# Patient Record
Sex: FEMALE | Race: WHITE | Employment: UNEMPLOYED | URBAN - METROPOLITAN AREA
[De-identification: names, ages, dates, MRNs, and addresses within clinical notes are randomized per-mention and may not be internally consistent; named-entity substitution may affect disease eponyms.]

---

## 2019-08-26 NOTE — H&P
Date: 2019 9:00 AM   Patient Name: Elsa Marin   Account #: [id number]    Gender: Female    (age): 1985 (28)       Provider:     JONATHON Neves. Nunu Gee MD        Referring Physician:     Candace Aldana  [address] [facility], [facility] [address] [facility]  [phone] (phone)  [phone] (fax)        Chief Complaint: pain with tube feeding           History of Present Illness:   Here with mom and sister for issues with Ubaldo-Key button. Had another feeding tube for 30years that was more centrally located, but a few years ago this was moved more laterally so they could perform a NISEEN due to severe reflux. Mom changes the Ubaldo-Key button every 3 months at home and there are no issues. This tube is used for hydration only, she eats by mouth. Mom states ever since she changed the tube 3 weeks ago, Manuel Dawkins indicates she is in a lot of pain  (crying and screaming) with flushing 60-70% of the time. There are times where there is no pain but it happens more often than it doesn't. She does not use the tube for feeding. Saw Dr. Car Garcia yesterday who deflated the tube and removed it and it looked OK. Mom notes more redness aroaund the tube than usual and there is occasional drainage, but nothing purulent and no odor. Mom does note that within a few hours after eating, Manuel Dawkins seems uncomfortable but there is no reflux. 4.0 Elena tube-mom thinks the 4.0 sicks out more than the 3.0 and a 5.0 was too long. She is not on any reflux meds. Mom thinks last EGD may have been 4 years ago? HX of chronic constipation but managing well with 2d miralax then off one day. Has a normal, solid stool. NO bleeding. Unable to obtain blood pressure due to contracted and spastic movement. Yesterday /60 at Dr. Irma Bucio office. +weight gain per mom since having NISSEN. ? Here with mom and sister for issues with Ubaldo-Key button.  Had another feeding tube for 30years that was more centrally located, but a few years ago this was moved more laterally so they could perform a NISEEN due to severe reflux. Mom changes the Ubaldo-Key button every 3 months at home and there are no issues. This tube is used for hydration only, she eats by mouth. Mom states ever since she changed the tube 3 weeks ago, [facility] indicates she is in a lot of pain  (crying and screaming) with flushing 60-70% of the time. There are times where there is no pain but it happens more often than it doesn't. She does not use the tube for feeding. Saw Dr. Arnol Winn yesterday who deflated the tube and removed it and it looked OK. Mom notes more redness aroaund the tube than usual and there is occasional drainage, but nothing purulent and no odor. Mom does note that within a few hours after eating, [facility] seems uncomfortable but there is no reflux. 4.0 Elena tube-mom thinks the 4.0 sicks out more than the 3.0 and a 5.0 was too long. She is not on any reflux meds. Mom thinks last EGD may have been 4 years ago? HX of chronic constipation but managing well with 2d miralax then off one day. Has a normal, solid stool. NO bleeding. Unable to obtain blood pressure due to contracted and spastic movement. Yesterday /60 at Dr. Haile Valles office. +weight gain per mom since having NISSEN. Past Medical History      Medical Conditions: Daily activites, walking, moving from bed to chair? Oxygen  Tracheotomy   Surgical Procedures:  Other major surgeries:, Tracheotomy feeding tube  Wrap of stomach, Dr. Steve Jacobsen 08/2015   Dx Studies: CAT Scan, 09/4/13  Patient Service Interview, 8/16/2019   Medications: albuterol (bulk) nebulizer as needed  lorazepam 1 mg TK 1 T PO BID PRN  Miralax 17 gram 1 caplet by mouth once a day  Stool Softener 240 mg Take 1 by mouth once a day   Allergies: Patient has no known allergies or drug allergies   Immunizations: No Immunizations      Social History      Alcohol: None   Tobacco: Never smoker   Drugs: None   Exercise: None   Caffeine: None   Marital Status:          Occupation:               Family History No history of Colon Cancer, Colon Polyps, Esophogeal Cancer, GI Cancers, IBD (Crohn's or UC), Liver disease        Review of Systems:   Cardiovascular: Presents suffers from Sage Serrano. Denies chest pain, irregular heart beat, palpitations, peripheral edema, syncope. Constitutional: Denies fatigue, fever, loss of appetite, weight gain, weight loss. ENMT: Denies nose bleeds, sore throat, hearing loss. Endocrine: Denies excessive thirst, heat intolerance. Eyes: Denies loss of vision. Gastrointestinal: Presents suffers from abdominal pain, constipation, stomach cramps. Denies abdominal swelling, change in bowel habits, diarrhea, Bloating/gas, heartburn, jaundice, nausea, rectal bleeding, vomiting, dysphagia, rectal pain, Stool incontinence, hematemesis. Genitourinary: Denies dark urine, dysuria, frequent urination, hematuria, incontinence. Hematologic/Lymphatic: Denies easy bruising, prolonged bleeding. Integumentary: Denies itching, rashes, sun sensitivity. Musculoskeletal: Denies arthritis, back pain, gout, joint pain, muscle weakness, stiffness. Neurological: Denies dizziness, fainting, frequent headaches, memory loss. Psychiatric: Denies anxiety, depression, difficulty sleeping, hallucinations, nervousness, panic attacks, paranoia. Respiratory: Denies cough, dyspnea, wheezing. Vital Signs:   Weight (lbs/oz) Height (ft/in) BMI   100 /  5 /  19.53   Notes BP taken in leg yesterday by Dr. Chadwick Hun: 144/68      Physical Exam:   Constitutional:      Appearance: NAD, in w/c. Communication: nonverbal.   Skin:      Inspection: No jaundice or rash. Palpation: warm and dry. Head/face: Inspection: NCAT. Eyes:      Conjunctivae/lids: normal.   Pupils/irises: PERRLA. ENMT:      External: unable to assess. Neck:      Neck: trachea midline. Respiratory:      Effort: O2 in place, normal respiratory function. Gastrointestinal/Abdomen:      Abdomen: Elena button intact with slight erythema around tube site, no purulence; abdomen is soft and nontender. Liver/Spleen: no ascites, liver and spleen not palpable. Musculoskeletal:      Gait/station: nonambulatory; contracted extremities . Muscles: rigidity, contracture. Psychiatric:      Judgment/insight: unable to assess. Mood and affect: unable to assess. Lab Results: No Electronic Results      Impressions: Constipation, chronic-improved on miralax  GERD-improved s/p NISSEN  Gastrostomy status? ?Constipation, chronic-improved on miralax? ?  ? ?GERD-improved s/p NISSEN? ?  ? ? Gastrostomy status? Assessment: Concern for compression ulcer associated with tube in light of weight gain, etc.  Will perform endoscopic evaluation for further evaluate. ? Concern for compression ulcer associated with tube in light of weight gain, etc.  Will perform endoscopic evaluation for further evaluate. Plan: Upper Endoscopy  Upper Endoscopy (EGD): options, risks, benefits and complications of bleeding, tear, surgical repair (1:1000) and reaction to medication, aspiration, and pneumonia explained to patient. 5% chance of missing lesions explained. All questions answered. ? ? Upper Endoscopy? ?  ? ? Upper Endoscopy (EGD): options, risks, benefits and complications of bleeding, tear, surgical repair (1:1000) and reaction to medication, aspiration, and pneumonia explained to patient. 5% chance of missing lesions explained. All questions answered. ? Risk & Medical Necessity: The patient requires Moderate Severity care for this visit. Diagnosis and management options are Extensive. The amount of data reviewed and/or ordered is Minimal/None. The level of risk is Moderate. Notes: Dr. Geni Paniagua  was available for consultation during this visit.              Tree Espinoza PA-C     Electronically signed on 8/16/2019 9:45:31 AM by Tree Espinoza PA-C Peter Hernandez. Alecia Wick MD     Electronically signed on 2019 8:28:23 PM by Peter Hernandez. Alecia Wick MD                                 Addenda   #1 - 2019 8:28:10 PM - [facility]  Physician Addendum: I have seen and examined the patient with Avila Martinez NP. Pertinent history is pain with water thru peg. Exam of the heart and lungs are unremarkable. Abdominal exam showed has peg button. We will move forward with the impression and plan of egd. Electronically signed on 2019 8:28:10 PM by Peter Hernandez.  Alecia Wick MD          Naina Lee, MRN [id number],  1985 First Visit, 2019                                                                                                                                                        New     Modify          Delete     Delete all     Edit Wording          Sign     [email or web address]

## 2019-08-29 RX ORDER — DOCUSATE SODIUM 50 MG/5ML
50 LIQUID ORAL EVERY OTHER DAY
COMMUNITY

## 2019-08-29 RX ORDER — LORAZEPAM 1 MG/1
1 TABLET ORAL 2 TIMES DAILY
COMMUNITY

## 2019-08-30 ENCOUNTER — HOSPITAL ENCOUNTER (OUTPATIENT)
Age: 34
Setting detail: OUTPATIENT SURGERY
Discharge: HOME | End: 2019-08-30
Attending: SPECIALIST | Admitting: SPECIALIST
Payer: MEDICAID

## 2019-08-30 ENCOUNTER — ANESTHESIA (OUTPATIENT)
Dept: ENDOSCOPY | Age: 34
End: 2019-08-30
Payer: MEDICAID

## 2019-08-30 ENCOUNTER — ANESTHESIA EVENT (OUTPATIENT)
Dept: ENDOSCOPY | Age: 34
End: 2019-08-30
Payer: MEDICAID

## 2019-08-30 VITALS
SYSTOLIC BLOOD PRESSURE: 110 MMHG | RESPIRATION RATE: 22 BRPM | HEIGHT: 55 IN | HEART RATE: 116 BPM | BODY MASS INDEX: 23.14 KG/M2 | DIASTOLIC BLOOD PRESSURE: 51 MMHG | WEIGHT: 100 LBS | OXYGEN SATURATION: 99 % | TEMPERATURE: 99.2 F

## 2019-08-30 PROCEDURE — 76060000031 HC ANESTHESIA FIRST 0.5 HR: Performed by: SPECIALIST

## 2019-08-30 PROCEDURE — 74011250636 HC RX REV CODE- 250/636: Performed by: NURSE ANESTHETIST, CERTIFIED REGISTERED

## 2019-08-30 PROCEDURE — 88305 TISSUE EXAM BY PATHOLOGIST: CPT

## 2019-08-30 PROCEDURE — 76040000019: Performed by: SPECIALIST

## 2019-08-30 PROCEDURE — 77030021593 HC FCPS BIOP ENDOSC BSC -A: Performed by: SPECIALIST

## 2019-08-30 RX ORDER — FLUMAZENIL 0.1 MG/ML
0.2 INJECTION INTRAVENOUS
Status: DISCONTINUED | OUTPATIENT
Start: 2019-08-30 | End: 2019-08-30 | Stop reason: HOSPADM

## 2019-08-30 RX ORDER — PROPOFOL 10 MG/ML
INJECTION, EMULSION INTRAVENOUS
Status: DISCONTINUED | OUTPATIENT
Start: 2019-08-30 | End: 2019-08-30 | Stop reason: HOSPADM

## 2019-08-30 RX ORDER — PROPOFOL 10 MG/ML
INJECTION, EMULSION INTRAVENOUS AS NEEDED
Status: DISCONTINUED | OUTPATIENT
Start: 2019-08-30 | End: 2019-08-30 | Stop reason: HOSPADM

## 2019-08-30 RX ORDER — EPINEPHRINE 0.1 MG/ML
1 INJECTION INTRACARDIAC; INTRAVENOUS
Status: DISCONTINUED | OUTPATIENT
Start: 2019-08-30 | End: 2019-08-30 | Stop reason: HOSPADM

## 2019-08-30 RX ORDER — SODIUM CHLORIDE 9 MG/ML
50 INJECTION, SOLUTION INTRAVENOUS CONTINUOUS
Status: DISCONTINUED | OUTPATIENT
Start: 2019-08-30 | End: 2019-08-30 | Stop reason: HOSPADM

## 2019-08-30 RX ORDER — ESOMEPRAZOLE MAGNESIUM 40 MG/1
40 FOR SUSPENSION ORAL
Qty: 60 PACKET | Refills: 1 | Status: SHIPPED | OUTPATIENT
Start: 2019-08-30

## 2019-08-30 RX ORDER — FENTANYL CITRATE 50 UG/ML
25 INJECTION, SOLUTION INTRAMUSCULAR; INTRAVENOUS AS NEEDED
Status: DISCONTINUED | OUTPATIENT
Start: 2019-08-30 | End: 2019-08-30 | Stop reason: HOSPADM

## 2019-08-30 RX ORDER — DEXTROMETHORPHAN/PSEUDOEPHED 2.5-7.5/.8
1.2 DROPS ORAL
Status: DISCONTINUED | OUTPATIENT
Start: 2019-08-30 | End: 2019-08-30 | Stop reason: HOSPADM

## 2019-08-30 RX ORDER — SODIUM CHLORIDE 9 MG/ML
INJECTION, SOLUTION INTRAVENOUS
Status: DISCONTINUED | OUTPATIENT
Start: 2019-08-30 | End: 2019-08-30 | Stop reason: HOSPADM

## 2019-08-30 RX ORDER — ATROPINE SULFATE 0.1 MG/ML
0.5 INJECTION INTRAVENOUS
Status: DISCONTINUED | OUTPATIENT
Start: 2019-08-30 | End: 2019-08-30 | Stop reason: HOSPADM

## 2019-08-30 RX ORDER — NALOXONE HYDROCHLORIDE 0.4 MG/ML
0.4 INJECTION, SOLUTION INTRAMUSCULAR; INTRAVENOUS; SUBCUTANEOUS
Status: DISCONTINUED | OUTPATIENT
Start: 2019-08-30 | End: 2019-08-30 | Stop reason: HOSPADM

## 2019-08-30 RX ORDER — MIDAZOLAM HYDROCHLORIDE 1 MG/ML
.25-5 INJECTION, SOLUTION INTRAMUSCULAR; INTRAVENOUS AS NEEDED
Status: DISCONTINUED | OUTPATIENT
Start: 2019-08-30 | End: 2019-08-30 | Stop reason: HOSPADM

## 2019-08-30 RX ADMIN — PROPOFOL 50 MG: 10 INJECTION, EMULSION INTRAVENOUS at 12:48

## 2019-08-30 RX ADMIN — PROPOFOL 100 MCG/KG/MIN: 10 INJECTION, EMULSION INTRAVENOUS at 12:45

## 2019-08-30 RX ADMIN — SODIUM CHLORIDE: 9 INJECTION, SOLUTION INTRAVENOUS at 12:39

## 2019-08-30 NOTE — DISCHARGE INSTRUCTIONS
[address] [facility] YELITZA Melgoza MD  [phone]      August 30, 2019     Samra Schwartz  YOB: 1985    ENDOSCOPY DISCHARGE INSTRUCTIONS    If there is redness at IV site you should apply warm compress to area. If redness or soreness persist contact Dr. Sriram Melgoza' or your primary care doctor. Gaseous discomfort may develop, but walking, belching will help relieve this. You may not operate a vehicle for 12 hours  You may not operate machinery or dangerous appliances for rest of today  You may not drink alcoholic beverages for 12 hours  Avoid making any critical decisions for 24 hours    DIET:  You may resume your normal diet, but some patients find that heavy or large meals may lead to indigestion or vomiting. I suggest a light meal as first food intake. MEDICATIONS:  The use of some over-the-counter pain medication may lead to bleeding after biopsies or other procedures you may have had done. Tylenol (also called acetaminophen) is safe to take and will not lead to bleeding. Based on the procedure you had today you may not safely take aspirin or aspirin-like products for the next ten (10) days. ACTIVITY:  You may resume your normal household activities, but it is recommended that you spend the remainder of the day resting -  avoid any strenuous activity. CALL DR. Lexie Eaton' OFFICE IF:  Increasing pain, nausea, vomiting  Abdominal distension (swelling)  Significant new or increased bleeding (oral or rectal)  Fever/Chills  Chest pain/shortness of breath                   Additional instructions:   No aspirin 10 days  The feeding tube looks fine, but there is an area of irritation in the stomach which I biopsied and for that I suggest she try nexium for a few weeks. It was an honor to be your doctor today. Please let me or my office staff know if you have any feedback about today's procedure. Nish Saul MD

## 2019-08-30 NOTE — ANESTHESIA POSTPROCEDURE EVALUATION
Procedure(s):  ESOPHAGOGASTRODUODENOSCOPY (EGD)  ESOPHAGOGASTRODUODENAL (EGD) BIOPSY. MAC, general - backup    Anesthesia Post Evaluation      Multimodal analgesia: multimodal analgesia not used between 6 hours prior to anesthesia start to PACU discharge  Patient location during evaluation: bedside  Patient participation: complete - patient cannot participate  Level of consciousness: awake (BASELINE MENTAL STATUS)  Pain management: adequate  Airway patency: patent  Anesthetic complications: no  Cardiovascular status: acceptable  Respiratory status: acceptable  Hydration status: acceptable  Post anesthesia nausea and vomiting:  controlled      Vitals Value Taken Time   /51 8/30/2019  1:21 PM   Temp     Pulse 112 8/30/2019  1:28 PM   Resp 24 8/30/2019  1:28 PM   SpO2 91 % 8/30/2019  1:22 PM   Vitals shown include unvalidated device data.

## 2019-08-30 NOTE — PERIOP NOTES
1030- patient upon arrival was extremely spastic, asked  from anesthesia for recommendations for placement of IV/sedation. After a long discussion between Dr. Avinash Little and the patients mother, Dr. Avinash Little instructed her to administer Ativan 1 mg via her feeding tube, that was brought from home. 1035- Patients mother, the primary care giver gave the patient 1 mg of crushed Ativan, that was brought form home, through her feeding tube after discussing it with Dr. Shirley Grief was discussed with Dr. Avinash Little and staff, we will take patient back to procedure room and give her anesthesia to insert IV and get pre procedure vital signs off of monitor. After this, we will begin the endoscopy EGD procedure under anesthesia. 1118- Patient resting comfortably on stretcher, mother/caregiver (Shaniqua Padilla) at bedside, waiting for procedure start, will continue to monitor pt.    1235- Patient was transported to procedure room, and care was assumed by anesthesia team including IV insertion. Patient will be monitored and sedated by anesthesia for their procedure. See anesthesia note. 1253- Endoscope was pre-cleaned at bedside immediately following procedure by lacy Florentino. 1258- Patient tolerated procedure without problems. Abdomen soft and patient arousable. Patient does not look to be in any pain. Report received from CRNA, see anesthesia note. Patient transported to endoscopy recovery area via stretcher report given to, Amor Maxwell RN.

## 2019-08-30 NOTE — PROGRESS NOTES
Prescript Nexium 40 mg packet phoned into [facility]. Spoke with Pharmacist Mortimer Rad.

## 2019-08-30 NOTE — ANESTHESIA PREPROCEDURE EVALUATION
Relevant Problems   No relevant active problems       Anesthetic History   No history of anesthetic complications            Review of Systems / Medical History  Patient summary reviewed, nursing notes reviewed and pertinent labs reviewed    Pulmonary                Comments: BPD    PULMONARY FIBROSIS    02 AT HOME   Neuro/Psych             Comments: STATIC ENCEPHALOPATHY (CP) Cardiovascular                  Exercise tolerance: >4 METS     GI/Hepatic/Renal               Comments: FEEDING TUBE Endo/Other  Within defined limits           Other Findings              Physical Exam    Airway            Comments: UNABLE TO ADEQUATELY ASSESS DUE TO LACK OF COOPERATION Cardiovascular    Rhythm: regular  Rate: normal         Dental         Pulmonary  Breath sounds clear to auscultation               Abdominal  GI exam deferred       Other Findings            Anesthetic Plan    ASA: 3  Anesthesia type: MAC and general - backup          Induction: Inhalational  Anesthetic plan and risks discussed with: Mother      DISCUSSED WITH MOTHER OPTIONS FOR IV START OF IM INJECTION, INHAL. INDUCTION, SEDATION VIA G TUBE.  MOM WOULD PREFER INHAL INDUCTION BUT ILL ALSO GIVE DAILY DOSE OF LORAZEPAM VIA G TUBE PRIOR.

## 2019-08-30 NOTE — PROCEDURES
[address] [place], [place]  [phone]      2019    Esophagogastroduodenoscopy (EGD) Procedure Note  Gifty Sheth  : 1985  [address] [place] [address] Medical Record Number: [id number]      Indications:    Tube feeding difficulties. Referring Physician:  Garrel Hodgkin, MD  Anesthesia/Sedation:  Conscious sedation/deep sedation/monitored anesthesia -- see notes. Endoscopist:  Dr. Naty Riley  Complications:  None  Estimated Blood Loss:  None    Permit:  The indications, risks, benefits and alternatives were reviewed with the patient or their decision maker who was provided an opportunity to ask questions and all questions were answered. The specific risks of esophagogastroduodenoscopy with conscious sedation were reviewed, including but not limited to anesthetic complication, bleeding, adverse drug reaction, missed lesion, infection, IV site reactions, and intestinal perforation which would lead to the need for surgical repair. Alternatives to EGD including radiographic imaging, observation without testing, or laboratory testing were reviewed as well as the limitations of those alternatives discussed. After considering the options and having all their questions answered, the patient or their decision maker provided both verbal and written consent to proceed. Procedure in Detail:  After obtaining informed consent, positioning of the patient in the left lateral decubitus position, and conduction of a pre-procedure pause or \"time out\" the endoscope was introduced into the mouth and advanced to the duodenum. A careful inspection was made, and findings or interventions are described below. Findings:   Esophagus:normal  Stomach: Internal balloon is in proper position.   NO ulceration at PEG site internally but there is patchy erythema with some overlying hematin in the antrum biopsied for helicobacter. Duodenum/jejunum: normal      Specimens: See above    Impression: Gastritis, but low profile feeding device appears to be fine. Recommendations:  -Acid suppression with a proton pump inhibitor. , -Await pathology. Thank you for entrusting me with this patient's care. Please do not hesitate to contact me with any questions or if I can be of assistance with any of your other patients' GI needs. Signed By: Rafael Gaona MD                        August 30, 2019     Surgical assistant none. Implants none unless specified.

## 2019-08-30 NOTE — ROUTINE PROCESS
Herminia Ewinglise  1985  [id number]    Situation:  Verbal report received from: Jasmina Aguirre  Procedure: Procedure(s):  ESOPHAGOGASTRODUODENOSCOPY (EGD)  ESOPHAGOGASTRODUODENAL (EGD) BIOPSY    Background:    Preoperative diagnosis: EPIGASTRIC PAIN  Postoperative diagnosis: gastritis     :  Dr. Hayder Carrillo  Assistant(s): Endoscopy Technician-1: Rafy Fatima  Endoscopy RN-1: Kimberly Saenz    Specimens:   ID Type Source Tests Collected by Time Destination   1 : Antrum Biopsy Preservative   Lane Brown MD 8/30/2019 1252 Pathology     H. Pylori  no    Assessment:  Intra-procedure medications     Anesthesia gave intra-procedure sedation and medications, see anesthesia flow sheet yes    Intravenous fluids: NS@ KVO     Vital signs stable     Abdominal assessment: round and soft     Recommendation:  Discharge patient per MD order.   Family or Friend   Permission to share finding with family or friend yes

## 2019-08-30 NOTE — INTERVAL H&P NOTE
Pre-Endoscopy H&P Update  Chief complaint/HPI/ROS:  The indication for the procedure, the patient's history and the patient's current medications are reviewed prior to the procedure and that data is reported on the H&P to which this document is attached. Any significant complaints with regard to organ systems will be noted, and if not mentioned then a review of systems is not contributory. 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 No longer has tracheostomy but stoma still present and patent and used for clearing airway. Social   Social History     Tobacco Use    Smoking status: Never Smoker    Smokeless tobacco: Never Used   Substance Use Topics    Alcohol use: No      History reviewed. No pertinent family history. No Known Allergies   Prior to Admission Medications   Prescriptions Last Dose Informant Patient Reported? Taking? LORazepam (ATIVAN) 1 mg tablet 8/30/2019 at 1035  Yes Yes   Sig: Take 1 mg by mouth two (2) times a day. Oxygen 8/30/2019 at Unknown time  Yes Yes   Sig: Indications: 2 liters continuous. albuterol sulfate (PROVENTIL;VENTOLIN) 2.5 mg/0.5 mL Nebu nebulizer solution 4/30/2019 at as needed  Yes No   Sig: by Nebulization route once. cetirizine HCl (ZYRTEC PO) 8/29/2019 at pm  Yes Yes   Sig: Take 10 mg by mouth. docusate (COLACE) 50 mg/5 mL liquid 8/28/2019 at pm  Yes Yes   Sig: Take 50 mg by mouth every other day.    polyethylene glycol (MIRALAX) 17 gram packet 8/28/2019 at pm  Yes No Sig: Take 17 g by mouth daily. Facility-Administered Medications: None       PHYSICAL EXAM:  The patient is examined immediately prior to the procedure. Visit Vitals  /57   Pulse (!) 123   Temp 99.2 °F (37.3 °C)   Resp 25   Ht 4' 5\" (1.346 m)   Wt 45.4 kg (100 lb)   SpO2 97%   Breastfeeding? No   BMI 25.03 kg/m²     Gen: Appears comfortable, no distress. Pulm: comfortable respirations with no abnormal audible breath sounds  HEART: well perfused, no abnormal audible heart sounds  GI: abdomen flat. PLAN:  Informed consent discussion held, patient afforded an opportunity to ask questions and all questions answered. After being advised of the risks, benefits, and alternatives, the patient requested that we proceed and indicated so on a written consent form. Will proceed with procedure as planned.   Meme Pacheco MD

## 2023-05-25 RX ORDER — POLYETHYLENE GLYCOL 3350 17 G/17G
17 POWDER, FOR SOLUTION ORAL DAILY
COMMUNITY

## 2023-05-25 RX ORDER — DOCUSATE SODIUM 50 MG/5ML
50 LIQUID ORAL EVERY OTHER DAY
COMMUNITY

## 2023-05-25 RX ORDER — ESOMEPRAZOLE MAGNESIUM 40 MG/1
40 FOR SUSPENSION ORAL
COMMUNITY
Start: 2019-08-30

## 2023-05-25 RX ORDER — LORAZEPAM 1 MG/1
1 TABLET ORAL 2 TIMES DAILY
COMMUNITY

## 2023-07-03 RX ORDER — MEPERIDINE HYDROCHLORIDE 25 MG/ML
12.5 INJECTION INTRAMUSCULAR; INTRAVENOUS; SUBCUTANEOUS EVERY 5 MIN PRN
Status: CANCELLED | OUTPATIENT
Start: 2023-07-03

## 2023-07-03 RX ORDER — ACETAMINOPHEN 325 MG/1
650 TABLET ORAL ONCE
Status: CANCELLED | OUTPATIENT
Start: 2023-07-03 | End: 2023-07-03

## 2023-07-03 RX ORDER — ONDANSETRON 2 MG/ML
4 INJECTION INTRAMUSCULAR; INTRAVENOUS
Status: CANCELLED | OUTPATIENT
Start: 2023-07-03 | End: 2023-07-04

## 2023-07-03 RX ORDER — DROPERIDOL 2.5 MG/ML
0.62 INJECTION, SOLUTION INTRAMUSCULAR; INTRAVENOUS
Status: CANCELLED | OUTPATIENT
Start: 2023-07-03 | End: 2023-07-04

## 2023-07-03 RX ORDER — SODIUM CHLORIDE, SODIUM LACTATE, POTASSIUM CHLORIDE, CALCIUM CHLORIDE 600; 310; 30; 20 MG/100ML; MG/100ML; MG/100ML; MG/100ML
INJECTION, SOLUTION INTRAVENOUS CONTINUOUS
Status: CANCELLED | OUTPATIENT
Start: 2023-07-03

## 2023-07-03 RX ORDER — DIPHENHYDRAMINE HYDROCHLORIDE 50 MG/ML
12.5 INJECTION INTRAMUSCULAR; INTRAVENOUS
Status: CANCELLED | OUTPATIENT
Start: 2023-07-03 | End: 2023-07-04

## 2023-07-03 RX ORDER — LABETALOL HYDROCHLORIDE 5 MG/ML
10 INJECTION, SOLUTION INTRAVENOUS
Status: CANCELLED | OUTPATIENT
Start: 2023-07-03

## 2023-07-03 RX ORDER — LIDOCAINE HYDROCHLORIDE 10 MG/ML
1 INJECTION, SOLUTION EPIDURAL; INFILTRATION; INTRACAUDAL; PERINEURAL
Status: CANCELLED | OUTPATIENT
Start: 2023-07-03 | End: 2023-07-04

## 2023-07-05 ENCOUNTER — HOSPITAL ENCOUNTER (OUTPATIENT)
Facility: HOSPITAL | Age: 38
Setting detail: SPECIMEN
Discharge: HOME | End: 2023-07-08
Payer: MEDICAID

## 2023-07-05 ENCOUNTER — ANESTHESIA EVENT (OUTPATIENT)
Facility: HOSPITAL | Age: 38
End: 2023-07-05
Payer: MEDICAID

## 2023-07-05 ENCOUNTER — ANESTHESIA (OUTPATIENT)
Facility: HOSPITAL | Age: 38
End: 2023-07-05
Payer: MEDICAID

## 2023-07-05 ENCOUNTER — HOSPITAL ENCOUNTER (OUTPATIENT)
Facility: HOSPITAL | Age: 38
Setting detail: OUTPATIENT SURGERY
Discharge: HOME | End: 2023-07-05
Attending: OBSTETRICS & GYNECOLOGY | Admitting: OBSTETRICS & GYNECOLOGY
Payer: MEDICAID

## 2023-07-05 VITALS
SYSTOLIC BLOOD PRESSURE: 120 MMHG | DIASTOLIC BLOOD PRESSURE: 59 MMHG | OXYGEN SATURATION: 100 % | HEART RATE: 85 BPM | RESPIRATION RATE: 16 BRPM

## 2023-07-05 PROBLEM — N94.6 DYSMENORRHEA: Status: ACTIVE | Noted: 2023-07-05

## 2023-07-05 PROCEDURE — 7100000001 HC PACU RECOVERY - ADDTL 15 MIN: Performed by: OBSTETRICS & GYNECOLOGY

## 2023-07-05 PROCEDURE — 3700000001 HC ADD 15 MINUTES (ANESTHESIA): Performed by: OBSTETRICS & GYNECOLOGY

## 2023-07-05 PROCEDURE — 3600000002 HC SURGERY LEVEL 2 BASE: Performed by: OBSTETRICS & GYNECOLOGY

## 2023-07-05 PROCEDURE — 2500000003 HC RX 250 WO HCPCS: Performed by: NURSE ANESTHETIST, CERTIFIED REGISTERED

## 2023-07-05 PROCEDURE — 3700000000 HC ANESTHESIA ATTENDED CARE: Performed by: OBSTETRICS & GYNECOLOGY

## 2023-07-05 PROCEDURE — 2709999900 HC NON-CHARGEABLE SUPPLY: Performed by: OBSTETRICS & GYNECOLOGY

## 2023-07-05 PROCEDURE — 2500000003 HC RX 250 WO HCPCS: Performed by: OBSTETRICS & GYNECOLOGY

## 2023-07-05 PROCEDURE — 88142 CYTOPATH C/V THIN LAYER: CPT

## 2023-07-05 PROCEDURE — 6360000002 HC RX W HCPCS: Performed by: NURSE ANESTHETIST, CERTIFIED REGISTERED

## 2023-07-05 PROCEDURE — 2580000003 HC RX 258: Performed by: NURSE ANESTHETIST, CERTIFIED REGISTERED

## 2023-07-05 PROCEDURE — 7100000010 HC PHASE II RECOVERY - FIRST 15 MIN: Performed by: OBSTETRICS & GYNECOLOGY

## 2023-07-05 PROCEDURE — 3600000012 HC SURGERY LEVEL 2 ADDTL 15MIN: Performed by: OBSTETRICS & GYNECOLOGY

## 2023-07-05 PROCEDURE — 7100000000 HC PACU RECOVERY - FIRST 15 MIN: Performed by: OBSTETRICS & GYNECOLOGY

## 2023-07-05 RX ORDER — PROPOFOL 10 MG/ML
INJECTION, EMULSION INTRAVENOUS PRN
Status: DISCONTINUED | OUTPATIENT
Start: 2023-07-05 | End: 2023-07-05 | Stop reason: SDUPTHER

## 2023-07-05 RX ORDER — DEXAMETHASONE SODIUM PHOSPHATE 4 MG/ML
INJECTION, SOLUTION INTRA-ARTICULAR; INTRALESIONAL; INTRAMUSCULAR; INTRAVENOUS; SOFT TISSUE PRN
Status: DISCONTINUED | OUTPATIENT
Start: 2023-07-05 | End: 2023-07-05 | Stop reason: SDUPTHER

## 2023-07-05 RX ORDER — PROPOFOL 10 MG/ML
INJECTION, EMULSION INTRAVENOUS CONTINUOUS PRN
Status: DISCONTINUED | OUTPATIENT
Start: 2023-07-05 | End: 2023-07-05 | Stop reason: SDUPTHER

## 2023-07-05 RX ORDER — DEXMEDETOMIDINE HYDROCHLORIDE 100 UG/ML
INJECTION, SOLUTION INTRAVENOUS PRN
Status: DISCONTINUED | OUTPATIENT
Start: 2023-07-05 | End: 2023-07-05 | Stop reason: SDUPTHER

## 2023-07-05 RX ORDER — FENTANYL CITRATE 50 UG/ML
INJECTION, SOLUTION INTRAMUSCULAR; INTRAVENOUS PRN
Status: DISCONTINUED | OUTPATIENT
Start: 2023-07-05 | End: 2023-07-05 | Stop reason: SDUPTHER

## 2023-07-05 RX ORDER — SODIUM CHLORIDE 9 MG/ML
INJECTION, SOLUTION INTRAVENOUS PRN
Status: CANCELLED | OUTPATIENT
Start: 2023-07-05

## 2023-07-05 RX ORDER — PHENYLEPHRINE HCL IN 0.9% NACL 0.4MG/10ML
SYRINGE (ML) INTRAVENOUS PRN
Status: DISCONTINUED | OUTPATIENT
Start: 2023-07-05 | End: 2023-07-05 | Stop reason: SDUPTHER

## 2023-07-05 RX ORDER — EPHEDRINE SULFATE/0.9% NACL/PF 50 MG/5 ML
SYRINGE (ML) INTRAVENOUS PRN
Status: DISCONTINUED | OUTPATIENT
Start: 2023-07-05 | End: 2023-07-05 | Stop reason: SDUPTHER

## 2023-07-05 RX ORDER — KETOROLAC TROMETHAMINE 30 MG/ML
INJECTION, SOLUTION INTRAMUSCULAR; INTRAVENOUS PRN
Status: DISCONTINUED | OUTPATIENT
Start: 2023-07-05 | End: 2023-07-05 | Stop reason: SDUPTHER

## 2023-07-05 RX ORDER — IBUPROFEN 800 MG/1
800 TABLET ORAL 2 TIMES DAILY PRN
Qty: 60 TABLET | Refills: 0 | Status: SHIPPED | OUTPATIENT
Start: 2023-07-05

## 2023-07-05 RX ORDER — ONDANSETRON 2 MG/ML
INJECTION INTRAMUSCULAR; INTRAVENOUS PRN
Status: DISCONTINUED | OUTPATIENT
Start: 2023-07-05 | End: 2023-07-05 | Stop reason: SDUPTHER

## 2023-07-05 RX ORDER — SODIUM CHLORIDE, SODIUM LACTATE, POTASSIUM CHLORIDE, CALCIUM CHLORIDE 600; 310; 30; 20 MG/100ML; MG/100ML; MG/100ML; MG/100ML
INJECTION, SOLUTION INTRAVENOUS CONTINUOUS PRN
Status: DISCONTINUED | OUTPATIENT
Start: 2023-07-05 | End: 2023-07-05 | Stop reason: SDUPTHER

## 2023-07-05 RX ORDER — SODIUM CHLORIDE 0.9 % (FLUSH) 0.9 %
5-40 SYRINGE (ML) INJECTION EVERY 12 HOURS SCHEDULED
Status: CANCELLED | OUTPATIENT
Start: 2023-07-05

## 2023-07-05 RX ORDER — SODIUM CHLORIDE 0.9 % (FLUSH) 0.9 %
5-40 SYRINGE (ML) INJECTION PRN
Status: CANCELLED | OUTPATIENT
Start: 2023-07-05

## 2023-07-05 RX ADMIN — SODIUM CHLORIDE, POTASSIUM CHLORIDE, SODIUM LACTATE AND CALCIUM CHLORIDE: 600; 310; 30; 20 INJECTION, SOLUTION INTRAVENOUS at 12:40

## 2023-07-05 RX ADMIN — DEXMEDETOMIDINE 4 MCG: 100 INJECTION, SOLUTION INTRAVENOUS at 12:57

## 2023-07-05 RX ADMIN — FENTANYL CITRATE 25 MCG: 50 INJECTION, SOLUTION INTRAMUSCULAR; INTRAVENOUS at 12:37

## 2023-07-05 RX ADMIN — Medication 40 MCG: at 12:45

## 2023-07-05 RX ADMIN — PROPOFOL 50 MG: 10 INJECTION, EMULSION INTRAVENOUS at 12:37

## 2023-07-05 RX ADMIN — DEXAMETHASONE SODIUM PHOSPHATE 8 MG: 4 INJECTION, SOLUTION INTRAMUSCULAR; INTRAVENOUS at 12:37

## 2023-07-05 RX ADMIN — KETOROLAC TROMETHAMINE 30 MG: 30 INJECTION, SOLUTION INTRAMUSCULAR; INTRAVENOUS at 13:11

## 2023-07-05 RX ADMIN — FENTANYL CITRATE 25 MCG: 50 INJECTION, SOLUTION INTRAMUSCULAR; INTRAVENOUS at 12:43

## 2023-07-05 RX ADMIN — Medication 10 MG: at 12:57

## 2023-07-05 RX ADMIN — DEXMEDETOMIDINE 4 MCG: 100 INJECTION, SOLUTION INTRAVENOUS at 12:41

## 2023-07-05 RX ADMIN — ONDANSETRON HYDROCHLORIDE 4 MG: 2 SOLUTION INTRAMUSCULAR; INTRAVENOUS at 13:11

## 2023-07-05 RX ADMIN — PROPOFOL 75 MCG/KG/MIN: 10 INJECTION, EMULSION INTRAVENOUS at 12:40

## 2023-07-05 NOTE — ANESTHESIA POSTPROCEDURE EVALUATION
Department of Anesthesiology  Postprocedure Note    Patient: Erik Kaye  MRN: [id number]  YOB: 1985  Date of evaluation: 7/5/2023      Procedure Summary     Date: 07/05/23 Room / Location: [facility] MAIN OR  / [facility] MAIN OR    Anesthesia Start: 6586 Anesthesia Stop:     Procedure: EXAMINATION UNDER ANESTHESIA, PELVIC ULTRASOUND AND PAPSMEAR, NEXPLANON INSERTION (Abdomen/Perineum) Diagnosis:       Pelvic pain in female      Dysmenorrhea      (Pelvic pain in female [R10.2])      (Dysmenorrhea [N94.6])    Surgeons: Sloane Tariq MD Responsible Provider: Maximilian Andrade DO    Anesthesia Type: general ASA Status: 4          Anesthesia Type: No value filed. Juan Phase I:      Juan Phase II:        Anesthesia Post Evaluation    Patient location during evaluation: PACU  Patient participation: complete - patient participated  Level of consciousness: awake  Airway patency: patent  Nausea & Vomiting: no vomiting and no nausea  Complications: no  Cardiovascular status: hemodynamically stable  Respiratory status: acceptable  Hydration status: stable  Comments: Patient seen and examined. Ready for discharge from PACU. At baseline.

## 2023-07-05 NOTE — H&P
Gynecology History and Physical    Name: Naayna School MRN: [id number] SSN: [id number]    YOB: 1985  Age: 44 y.o. Sex: female       Subjective:      Chief complaint:  Zee De Leon is a 44 y.o. female with h/o cerebral palsy and dysmenorrhea presents today for  Procedure(s) (LRB):  EXAMINATION UNDER ANESTHESIA, PELVIC ULTRASOUND AND PAPSMEAR, NEXPLANON INSERTION (N/A). OB History    No obstetric history on file. 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 No longer has tracheostomy but stoma still present and patent and used for clearing airway. Social History     Occupational History    Not on file   Tobacco Use    Smoking status: Never    Smokeless tobacco: Never   Substance and Sexual Activity    Alcohol use: No    Drug use: Not on file    Sexual activity: Not on file     No family history on file. Not on File  Prior to Admission medications    Medication Sig Start Date End Date Taking? Authorizing Provider   OXYGEN Indications: 2 liters continuous.     Ar Automatic Reconciliation   albuterol (PROVENTIL) (5 MG/ML) 0.5% nebulizer solution Inhale into the lungs once    Ar Automatic Reconciliation   docusate sodium (COLACE) 150 MG/15ML liquid Take 5 mLs by mouth every other day    Ar Automatic Reconciliation   esomeprazole Magnesium (NEXIUM) 40 MG PACK Take 1 packet by mouth every morning (before breakfast) 8/30/19   Ar Automatic Reconciliation   LORazepam (ATIVAN) 1 MG tablet Take 1

## 2023-07-05 NOTE — OP NOTE
GYNECOLOGIC PROCEDURE NOTE      Patient - [address] Record Number - [id number]   YOB: 1985      DATE AND TIME OF PROCEDURE: [unfilled]   12:16 PM     PREOPERATIVE DIAGNOSIS: Pelvic pain in female [R10.2]  Dysmenorrhea [N94.6]    POSTOPERATIVE DIAGNOSIS: Same     PROCEDURE: Procedure(s):  EXAMINATION UNDER ANESTHESIA, PELVIC ULTRASOUND AND PAPSMEAR, NEXPLANON INSERTION     SURGEON:  Isabel Parks MD    ASSISTANT: None     ANESTHESIA: Choice     QUANTITATIVE BLOOD LOSS AT PROCEDURE END: None     COMPLICATIONS: * No complications entered in OR log *     IMPLANTS: *No implants in the log*    SPECIMENS: Pap smear     FINDINGS: Normal external genitalia. Leukorrhea noted on speculum exam. Normal cervix. Anterverted and anteflexed uterus. No palpable adnexal masses. Upon abdominal ultrasound normal sharply anteflexed uterus with thin endometrium noted. Ovaries were not visualized. No free fluid noted. Upon breast exam, no skin changes, lymphadenopathy. Normal nipple appearance. Left breast with 2-3 simple cystic lesion noted in upper outer quadrant. DESCRIPTION OF PROCEDURE:  The patient was taken to the operating room where she was placed in the supine position. After undergoing adequate anesthesia she was placed up in the [facility] laparoscopy stirrups in the dorsal lithotomy position. The patient was then prepped and draped in the usual fashion. Breast exam was performed with noted normal appearance bilaterally. No skin changes, lymphadenopathy. Normal nipple appearance. Left breast with 2-3 simple cystic lesion noted in upper outer quadrant. Pelvic exam was performed. Normal external genitalia. Normal cervix with no lesions. Pap smear was collected. Upon bimanual exam, normal anteverted uterus was noted. No adnexal masses were noted. Pelvic ultrasound was then performed. This was done abdominally as no transvaginal probe was available.  Uterus was sharply anteflexed and
Detail Level: Detailed

## 2025-03-31 ENCOUNTER — TELEPHONE (OUTPATIENT)
Age: 40
End: 2025-03-31

## 2025-03-31 NOTE — TELEPHONE ENCOUNTER
Katie Nowak patient's Mom(EC) was returning a call back from this office.  No documentation in record about a call.  Patient's PCP is in another practice.

## (undated) DEVICE — ADULT SPO2 SENSOR: Brand: NELLCOR

## (undated) DEVICE — KENDALL RADIOLUCENT FOAM MONITORING ELECTRODE -RECTANGULAR SHAPE: Brand: KENDALL

## (undated) DEVICE — CATH IV AUTOGRD BC PNK 20GA 25 -- INSYTE

## (undated) DEVICE — GLOVE SURG SZ 65 THK91MIL LTX FREE SYN POLYISOPRENE

## (undated) DEVICE — SET GRAV CK VLV NEEDLESS ST 3 GANGED 4WAY STPCOCK HI FLO 10

## (undated) DEVICE — PERI GYN-SFMC: Brand: MEDLINE INDUSTRIES, INC.

## (undated) DEVICE — KIT COLON W/ 1.1OZ LUB AND 2 END

## (undated) DEVICE — BOWL MED L 32OZ PLAS W/ MOLD GRAD EZ OPN PEEL PCH

## (undated) DEVICE — CANN NASAL O2 CAPNOGRAPHY AD -- FILTERLINE

## (undated) DEVICE — SOLIDIFIER MEDC 1200ML -- CONVERT TO 356117

## (undated) DEVICE — 1200 GUARD II KIT W/5MM TUBE W/O VAC TUBE: Brand: GUARDIAN

## (undated) DEVICE — CONTAINER SPEC 20 ML LID NEUT BUFF FORMALIN 10 % POLYPR STS

## (undated) DEVICE — Device

## (undated) DEVICE — PREMIUM WET SKIN PREP TRAY: Brand: MEDLINE INDUSTRIES, INC.

## (undated) DEVICE — BAG SPEC BIOHZRD 10 X 10 IN --

## (undated) DEVICE — FORCEPS BX L240CM JAW DIA2.8MM L CAP W/ NDL MIC MESH TOOTH

## (undated) DEVICE — BITEBLOCK ENDOSCP 60FR MAXI WHT POLYETH STURDY W/ VELC WVN

## (undated) DEVICE — BAG BELONG PT PERS CLEAR HANDL

## (undated) DEVICE — SET COLL TBNG L6FT DIA3/8IN W/ INTEGR SWVL HNDL SLIP RNG M

## (undated) DEVICE — CUFF BLD PRSS AD SZ 11 FOR 25-34CM 1 TB TRIPURPOSE CONN

## (undated) DEVICE — SOLUTION IRRIG 1000ML 0.9% SOD CHL USP POUR PLAS BTL